# Patient Record
Sex: MALE | Race: WHITE | Employment: OTHER | URBAN - METROPOLITAN AREA
[De-identification: names, ages, dates, MRNs, and addresses within clinical notes are randomized per-mention and may not be internally consistent; named-entity substitution may affect disease eponyms.]

---

## 2017-12-22 ENCOUNTER — HOSPITAL ENCOUNTER (EMERGENCY)
Age: 62
Discharge: HOME | End: 2017-12-23
Attending: EMERGENCY MEDICINE
Payer: COMMERCIAL

## 2017-12-22 ENCOUNTER — APPOINTMENT (OUTPATIENT)
Dept: CT IMAGING | Age: 62
End: 2017-12-22
Attending: EMERGENCY MEDICINE
Payer: COMMERCIAL

## 2017-12-22 DIAGNOSIS — M54.9 UPPER BACK PAIN ON LEFT SIDE: Primary | ICD-10-CM

## 2017-12-22 DIAGNOSIS — M54.12 CERVICAL RADICULAR PAIN: ICD-10-CM

## 2017-12-22 LAB
ALBUMIN SERPL-MCNC: 3.6 G/DL (ref 3.5–5)
ALBUMIN/GLOB SERPL: 1.1 {RATIO} (ref 1.1–2.2)
ALP SERPL-CCNC: 77 U/L (ref 45–117)
ALT SERPL-CCNC: 39 U/L (ref 12–78)
ANION GAP BLD CALC-SCNC: 20 MMOL/L (ref 5–15)
AST SERPL-CCNC: 17 U/L (ref 15–37)
BASOPHILS # BLD: 0 K/UL (ref 0–0.1)
BASOPHILS NFR BLD: 0 % (ref 0–1)
BILIRUB DIRECT SERPL-MCNC: 0.1 MG/DL (ref 0–0.2)
BILIRUB SERPL-MCNC: 0.3 MG/DL (ref 0.2–1)
BUN BLD-MCNC: 18 MG/DL (ref 9–20)
CA-I BLD-MCNC: 1.1 MMOL/L (ref 1.12–1.32)
CHLORIDE BLD-SCNC: 105 MMOL/L (ref 98–107)
CO2 BLD-SCNC: 23 MMOL/L (ref 21–32)
CREAT BLD-MCNC: 0.8 MG/DL (ref 0.6–1.3)
EOSINOPHIL # BLD: 0.2 K/UL (ref 0–0.4)
EOSINOPHIL NFR BLD: 3 % (ref 0–7)
ERYTHROCYTE [DISTWIDTH] IN BLOOD BY AUTOMATED COUNT: 13.1 % (ref 11.5–14.5)
GLOBULIN SER CALC-MCNC: 3.2 G/DL (ref 2–4)
GLUCOSE BLD-MCNC: 138 MG/DL (ref 65–100)
HCT VFR BLD AUTO: 41.4 % (ref 36.6–50.3)
HCT VFR BLD CALC: 41 % (ref 36.6–50.3)
HGB BLD-MCNC: 13.9 GM/DL (ref 12.1–17)
HGB BLD-MCNC: 14.3 G/DL (ref 12.1–17)
LYMPHOCYTES # BLD: 1.9 K/UL (ref 0.8–3.5)
LYMPHOCYTES NFR BLD: 35 % (ref 12–49)
MCH RBC QN AUTO: 30 PG (ref 26–34)
MCHC RBC AUTO-ENTMCNC: 34.5 G/DL (ref 30–36.5)
MCV RBC AUTO: 87 FL (ref 80–99)
MONOCYTES # BLD: 0.4 K/UL (ref 0–1)
MONOCYTES NFR BLD: 7 % (ref 5–13)
NEUTS SEG # BLD: 3 K/UL (ref 1.8–8)
NEUTS SEG NFR BLD: 55 % (ref 32–75)
PLATELET # BLD AUTO: 173 K/UL (ref 150–400)
POTASSIUM BLD-SCNC: 3.4 MMOL/L (ref 3.5–5.1)
PROT SERPL-MCNC: 6.8 G/DL (ref 6.4–8.2)
RBC # BLD AUTO: 4.76 M/UL (ref 4.1–5.7)
SERVICE CMNT-IMP: ABNORMAL
SODIUM BLD-SCNC: 143 MMOL/L (ref 136–145)
TROPONIN I SERPL-MCNC: <0.04 NG/ML
WBC # BLD AUTO: 5.4 K/UL (ref 4.1–11.1)

## 2017-12-22 PROCEDURE — 99285 EMERGENCY DEPT VISIT HI MDM: CPT

## 2017-12-22 PROCEDURE — 85025 COMPLETE CBC W/AUTO DIFF WBC: CPT | Performed by: EMERGENCY MEDICINE

## 2017-12-22 PROCEDURE — 36415 COLL VENOUS BLD VENIPUNCTURE: CPT | Performed by: EMERGENCY MEDICINE

## 2017-12-22 PROCEDURE — 80047 BASIC METABLC PNL IONIZED CA: CPT

## 2017-12-22 PROCEDURE — 84484 ASSAY OF TROPONIN QUANT: CPT | Performed by: EMERGENCY MEDICINE

## 2017-12-22 PROCEDURE — 93005 ELECTROCARDIOGRAM TRACING: CPT

## 2017-12-22 PROCEDURE — 96375 TX/PRO/DX INJ NEW DRUG ADDON: CPT

## 2017-12-22 PROCEDURE — 80076 HEPATIC FUNCTION PANEL: CPT | Performed by: EMERGENCY MEDICINE

## 2017-12-22 PROCEDURE — 74011250636 HC RX REV CODE- 250/636: Performed by: EMERGENCY MEDICINE

## 2017-12-22 PROCEDURE — 71275 CT ANGIOGRAPHY CHEST: CPT

## 2017-12-22 PROCEDURE — 96374 THER/PROPH/DIAG INJ IV PUSH: CPT

## 2017-12-22 RX ORDER — ONDANSETRON 2 MG/ML
4 INJECTION INTRAMUSCULAR; INTRAVENOUS
Status: COMPLETED | OUTPATIENT
Start: 2017-12-22 | End: 2017-12-22

## 2017-12-22 RX ORDER — SODIUM CHLORIDE 0.9 % (FLUSH) 0.9 %
5-10 SYRINGE (ML) INJECTION AS NEEDED
Status: DISCONTINUED | OUTPATIENT
Start: 2017-12-22 | End: 2017-12-23 | Stop reason: HOSPADM

## 2017-12-22 RX ORDER — MORPHINE SULFATE 4 MG/ML
4 INJECTION INTRAVENOUS ONCE
Status: COMPLETED | OUTPATIENT
Start: 2017-12-22 | End: 2017-12-22

## 2017-12-22 RX ORDER — SODIUM CHLORIDE 0.9 % (FLUSH) 0.9 %
5-10 SYRINGE (ML) INJECTION EVERY 8 HOURS
Status: DISCONTINUED | OUTPATIENT
Start: 2017-12-22 | End: 2017-12-23 | Stop reason: HOSPADM

## 2017-12-22 RX ADMIN — MORPHINE SULFATE 4 MG: 4 INJECTION INTRAVENOUS at 23:31

## 2017-12-22 RX ADMIN — Medication 10 ML: at 23:32

## 2017-12-22 RX ADMIN — ONDANSETRON 4 MG: 2 INJECTION INTRAMUSCULAR; INTRAVENOUS at 23:31

## 2017-12-23 VITALS
BODY MASS INDEX: 27.28 KG/M2 | HEIGHT: 68 IN | SYSTOLIC BLOOD PRESSURE: 136 MMHG | HEART RATE: 65 BPM | DIASTOLIC BLOOD PRESSURE: 85 MMHG | WEIGHT: 180 LBS | RESPIRATION RATE: 13 BRPM | TEMPERATURE: 97.8 F | OXYGEN SATURATION: 92 %

## 2017-12-23 PROCEDURE — 74011636320 HC RX REV CODE- 636/320: Performed by: RADIOLOGY

## 2017-12-23 RX ORDER — OXYCODONE AND ACETAMINOPHEN 5; 325 MG/1; MG/1
1 TABLET ORAL
Qty: 10 TAB | Refills: 0 | Status: SHIPPED | OUTPATIENT
Start: 2017-12-23

## 2017-12-23 RX ORDER — PREDNISONE 10 MG/1
TABLET ORAL
Qty: 21 TAB | Refills: 0 | Status: SHIPPED | OUTPATIENT
Start: 2017-12-23

## 2017-12-23 RX ORDER — CYCLOBENZAPRINE HCL 5 MG
5 TABLET ORAL
Qty: 12 TAB | Refills: 0 | Status: SHIPPED | OUTPATIENT
Start: 2017-12-23

## 2017-12-23 RX ADMIN — IOPAMIDOL 78 ML: 755 INJECTION, SOLUTION INTRAVENOUS at 00:03

## 2017-12-23 NOTE — ED TRIAGE NOTES
Patient arrives with c/o left arm pain, tingling and numbness and back pain that radiates to his left upper back onset 20 minutes. Denies nausea, vomiting and did not break into a sweat. Deric Chaidez at bedside for initial assessment.

## 2017-12-23 NOTE — ED PROVIDER NOTES
HPI Comments: 63 y.o. male with past medical history significant for high cholesterol and enlarged prostate who presents from home with chief complaint of left upper back and LUE pain. The pt c/o sharp LUE pain that radiates into his neck and L upper back with numbness and weakness that started 20 minutes PTA. The pt reports that he has had a cough for the last few days. The pain is worsened with movement and when he coughs. The pt had a normal stress test a few days ago. The pt took 81 mg ASA PTA. The pt has known arthritis in his upper back/neck. The pt denies CP and prior similar episode. There are no other acute medical concerns at this time. PCP: Maranda Farfan MD    Note written by Arabella West, as dictated by Bryce Flaherty MD 11:23 PM      The history is provided by the patient. No  was used. No past medical history on file. No past surgical history on file. No family history on file. Social History     Social History    Marital status:      Spouse name: N/A    Number of children: N/A    Years of education: N/A     Occupational History    Not on file. Social History Main Topics    Smoking status: Not on file    Smokeless tobacco: Not on file    Alcohol use Not on file    Drug use: Not on file    Sexual activity: Not on file     Other Topics Concern    Not on file     Social History Narrative         ALLERGIES: Review of patient's allergies indicates no known allergies. Review of Systems   Respiratory: Positive for cough. Musculoskeletal: Positive for back pain and neck pain. LUE pain   Neurological: Positive for weakness and numbness. All other systems reviewed and are negative. Vitals:    12/22/17 2313   Weight: 81.6 kg (180 lb)   Height: 5' 8\" (1.727 m)            Physical Exam   Constitutional: He is oriented to person, place, and time. He appears well-developed and well-nourished.    Appears uncomfortable;     HENT:   Head: Normocephalic and atraumatic. Eyes: Conjunctivae are normal. No scleral icterus. Neck: Neck supple. No tracheal deviation present. Cardiovascular: Normal rate, regular rhythm, normal heart sounds and intact distal pulses. Exam reveals no gallop and no friction rub. No murmur heard. 2+ radial pulses. Pulmonary/Chest: Effort normal and breath sounds normal. He has no wheezes. He has no rales. Abdominal: Soft. He exhibits no distension. There is no tenderness. There is no rebound and no guarding. Musculoskeletal: He exhibits no edema. Point tenderness in L upper back medial to the scapula     Neurological: He is alert and oriented to person, place, and time. Normal LUE strength and sensation. Skin: Skin is warm and dry. No rash noted. Psychiatric: He has a normal mood and affect. Nursing note and vitals reviewed. Note written by Arabella Bates, as dictated by Hanna Real MD 11:24 PM      [facility]  ED Course       Procedures  ED EKG interpretation:  Rhythm: normal sinus rhythm; and regular . Rate (approx.): 67; Axis: normal; ST/T wave: normal;   Note written by Arabella Bates, as dictated by Hanna Real MD 11:35 PM      Progress Note:  Results, treatment, and follow up plan have been discussed with patient/wife. Questions were answered. Hanna Real MD  12:39 AM     A/P: onset left upper back pain, LUE pain + weakness/numbness just prior to arrival - sx's began after coughing and are worse with movement; seems most c/w MSK pain with nerve involvement; ? HNP; ? Peripheral nerve impingement; strength and sensation normal on exam; VSS; CBC, CMP, trop, CT chest ok; good pain relief with Morphine. Feel safe for discharge on Percocet, Flexeril, Prednisone. PCP/spine f/u.   Hanna Real MD  12:42 AM

## 2017-12-23 NOTE — DISCHARGE INSTRUCTIONS
Cervical Disc Disease: Care Instructions  Your Care Instructions    Cervical disc disease results from damage, disease, or wear and tear to the discs between the bones (vertebra) in your neck. The discs act as shock absorbers for the spine and keep the spine flexible. When a disc is damaged, it can bulge out and press against the nerve roots or spinal cord. This is sometimes called a herniated or \"slipped disc. \" This pressure can cause pain and numbness or tingling in your arms and hands. It can also cause weakness in your legs. An accident can damage a disc and cause it to break open (rupture). Aging and hard physical work can also cause damage to cervical discs. The first treatments for cervical disc disease include physical therapy, special neck exercises, heat, and pain medicine. If these fail, your doctor may inject steroids and pain medicine into your neck. Surgery is usually done only if other treatments have not worked. Follow-up care is a key part of your treatment and safety. Be sure to make and go to all appointments, and call your doctor if you are having problems. It's also a good idea to know your test results and keep a list of the medicines you take. How can you care for yourself at home? · Take pain medicines exactly as directed. ¨ If the doctor gave you a prescription medicine for pain, take it as prescribed. ¨ If you are not taking a prescription pain medicine, ask your doctor if you can take an over-the-counter medicine. · Don't spend too long in one position. Take short breaks to move around and change positions. · Wear a seat belt and shoulder harness when you are in a car. · Sleep with a pillow under your head and neck that keeps your neck straight. · Follow your doctor's instructions for gentle neck-stretching exercises. · Do not smoke. Smoking can slow healing of your discs. If you need help quitting, talk to your doctor about stop-smoking programs and medicines.  These can increase your chances of quitting for good. · Avoid strenuous work or exercise until your doctor says it is okay. When should you call for help? Call 911 anytime you think you may need emergency care. For example, call if:  ? · You are unable to move an arm or a leg at all. ?Call your doctor now or seek immediate medical care if:  ? · You have new or worse symptoms in your arms, legs, belly, or buttocks. Symptoms may include:  ¨ Numbness or tingling. ¨ Weakness. ¨ Pain. ? · You lose bladder or bowel control. ? Watch closely for changes in your health, and be sure to contact your doctor if:  ? · You do not get better as expected. Where can you learn more? Go to http://trinidad-jason.info/. Enter N118 in the search box to learn more about \"Cervical Disc Disease: Care Instructions. \"  Current as of: March 21, 2017  Content Version: 11.4  © 3750-6778 [employer], [employer]. Care instructions adapted under license by [employer] (which disclaims liability or warranty for this information). If you have questions about a medical condition or this instruction, always ask your healthcare professional. [place] [address] any warranty or liability for your use of this information.

## 2017-12-23 NOTE — ED NOTES
Patient given discharge instructions & prescription (if applicable) by provider. Patient ambulatory out of facility with wife.

## 2017-12-26 LAB
ATRIAL RATE: 67 BPM
CALCULATED P AXIS, ECG09: 21 DEGREES
CALCULATED R AXIS, ECG10: -4 DEGREES
CALCULATED T AXIS, ECG11: 5 DEGREES
DIAGNOSIS, 93000: NORMAL
P-R INTERVAL, ECG05: 172 MS
Q-T INTERVAL, ECG07: 410 MS
QRS DURATION, ECG06: 104 MS
QTC CALCULATION (BEZET), ECG08: 433 MS
VENTRICULAR RATE, ECG03: 67 BPM

## 2022-10-21 ENCOUNTER — HOSPITAL ENCOUNTER (EMERGENCY)
Age: 67
Discharge: HOME | End: 2022-10-21
Attending: EMERGENCY MEDICINE
Payer: COMMERCIAL

## 2022-10-21 ENCOUNTER — APPOINTMENT (OUTPATIENT)
Dept: GENERAL RADIOLOGY | Age: 67
End: 2022-10-21
Attending: STUDENT IN AN ORGANIZED HEALTH CARE EDUCATION/TRAINING PROGRAM
Payer: COMMERCIAL

## 2022-10-21 VITALS
HEART RATE: 68 BPM | BODY MASS INDEX: 28.49 KG/M2 | TEMPERATURE: 98 F | HEIGHT: 68 IN | DIASTOLIC BLOOD PRESSURE: 106 MMHG | RESPIRATION RATE: 16 BRPM | WEIGHT: 188 LBS | OXYGEN SATURATION: 95 % | SYSTOLIC BLOOD PRESSURE: 179 MMHG
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
## 2022-10-21 DIAGNOSIS — M54.50 ACUTE MIDLINE LOW BACK PAIN WITHOUT SCIATICA: ICD-10-CM

## 2022-10-21 DIAGNOSIS — S52.602A CLOSED FRACTURE OF DISTAL END OF LEFT ULNA, UNSPECIFIED FRACTURE MORPHOLOGY, INITIAL ENCOUNTER: Primary | ICD-10-CM

## 2022-10-21 PROCEDURE — 73130 X-RAY EXAM OF HAND: CPT

## 2022-10-21 PROCEDURE — 75810000053 HC SPLINT APPLICATION

## 2022-10-21 PROCEDURE — 73090 X-RAY EXAM OF FOREARM: CPT

## 2022-10-21 PROCEDURE — 72100 X-RAY EXAM L-S SPINE 2/3 VWS: CPT

## 2022-10-21 PROCEDURE — 99283 EMERGENCY DEPT VISIT LOW MDM: CPT
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
## 2022-10-21 NOTE — Clinical Note
[facility]  [facility] EMERGENCY DEPT  [id number] [phone]  [phone]    Work/School Note    Date: 10/21/2022    To Whom It May concern:      Elan Diggs was seen and treated today in the emergency room by the following provider(s):  Attending Provider: Carrie Anderson MD  Physician Assistant: Cheryl Duggan, [address] [place], [place] [id number] Carl. Elan Diggs is excused from work/school on 10/21/22. He is clear to return to work/school on 10/22/22.         Sincerely,          MORALES Soler
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
## 2022-12-02 ENCOUNTER — TRANSCRIBE ORDER (OUTPATIENT)
Dept: SCHEDULING | Age: 67
End: 2022-12-02

## 2022-12-02 DIAGNOSIS — M54.16 LUMBAR RADICULITIS: Primary | ICD-10-CM

## 2022-12-07 ENCOUNTER — HOSPITAL ENCOUNTER (OUTPATIENT)
Dept: MRI IMAGING | Age: 67
Discharge: HOME | End: 2022-12-07
Attending: ORTHOPAEDIC SURGERY
Payer: MEDICARE

## 2022-12-07 DIAGNOSIS — M54.16 LUMBAR RADICULITIS: ICD-10-CM

## 2022-12-07 PROCEDURE — 72148 MRI LUMBAR SPINE W/O DYE: CPT

## 2025-07-14 ENCOUNTER — TRANSCRIBE ORDERS (OUTPATIENT)
Facility: HOSPITAL | Age: 70
End: 2025-07-14

## 2025-07-14 DIAGNOSIS — S83.8X1A MENISCAL INJURY, RIGHT, INITIAL ENCOUNTER: Primary | ICD-10-CM

## 2025-08-04 ENCOUNTER — HOSPITAL ENCOUNTER (OUTPATIENT)
Facility: HOSPITAL | Age: 70
Discharge: HOME | End: 2025-08-07
Attending: ORTHOPAEDIC SURGERY
Payer: MEDICARE

## 2025-08-04 DIAGNOSIS — S83.8X1A MENISCAL INJURY, RIGHT, INITIAL ENCOUNTER: ICD-10-CM

## 2025-08-04 PROCEDURE — 73721 MRI JNT OF LWR EXTRE W/O DYE: CPT
